# Patient Record
Sex: MALE | Race: WHITE | URBAN - METROPOLITAN AREA
[De-identification: names, ages, dates, MRNs, and addresses within clinical notes are randomized per-mention and may not be internally consistent; named-entity substitution may affect disease eponyms.]

---

## 2018-01-12 ENCOUNTER — OFFICE VISIT (OUTPATIENT)
Dept: FAMILY MEDICINE CLINIC | Facility: CLINIC | Age: 33
End: 2018-01-12

## 2018-01-12 VITALS
HEIGHT: 72 IN | OXYGEN SATURATION: 98 % | RESPIRATION RATE: 18 BRPM | TEMPERATURE: 100 F | HEART RATE: 94 BPM | SYSTOLIC BLOOD PRESSURE: 122 MMHG | WEIGHT: 170 LBS | BODY MASS INDEX: 23.03 KG/M2 | DIASTOLIC BLOOD PRESSURE: 80 MMHG

## 2018-01-12 DIAGNOSIS — J11.1 INFLUENZA-LIKE ILLNESS: Primary | ICD-10-CM

## 2018-01-12 PROCEDURE — 99202 OFFICE O/P NEW SF 15 MIN: CPT | Performed by: FAMILY MEDICINE

## 2018-01-12 RX ORDER — OSELTAMIVIR PHOSPHATE 75 MG/1
75 CAPSULE ORAL 2 TIMES DAILY
Qty: 10 CAPSULE | Refills: 0 | Status: SHIPPED | OUTPATIENT
Start: 2018-01-12 | End: 2018-01-17

## 2018-01-12 RX ORDER — BUPRENORPHINE HYDROCHLORIDE AND NALOXONE HYDROCHLORIDE 5.7; 1.4 MG/1; MG/1
TABLET, ORALLY DISINTEGRATING SUBLINGUAL
Refills: 0 | COMMUNITY
Start: 2018-01-06

## 2018-01-12 NOTE — PROGRESS NOTES
Patient presents with:  Post Nasal Drip: bodyaches x 1 day      SUBJECTIVE:   Isabela Esparza is a 34year old male who presents complaining of flu-like symptoms of fever to 100, malaise, fatigue, chills, myalgias, congestion, sore throat for 1 day with kera chains  Chest:  CTA, no wheezes, rhonchi or rales. Heart: RRR, no murmur. ASSESSMENT/PLAN:     Influenza-like illness (JOHN)  1. Symptomatic/supportive therapy as detailed in AVS  2. Antiviral therapy: tamiflu 75 bid x 5 days. . Risks vs benefits of m

## 2018-01-12 NOTE — PATIENT INSTRUCTIONS
Take tamiflu twice daily for 5 days with food. Use otc meds for comfort:  Ibuprofen for pain and fever or use combination cold remedies to reduce fever as well as nasal symptoms.   Consider applying adin's vapo-rub or eucalpytus oil to chest and feet at b

## 2020-10-29 ENCOUNTER — HOSPITAL ENCOUNTER (EMERGENCY)
Age: 35
Discharge: HOME | End: 2020-10-29
Attending: EMERGENCY MEDICINE
Payer: COMMERCIAL

## 2020-10-29 VITALS
HEART RATE: 56 BPM | RESPIRATION RATE: 16 BRPM | WEIGHT: 170 LBS | SYSTOLIC BLOOD PRESSURE: 140 MMHG | OXYGEN SATURATION: 100 % | DIASTOLIC BLOOD PRESSURE: 71 MMHG | TEMPERATURE: 97.9 F | BODY MASS INDEX: 23.03 KG/M2 | HEIGHT: 72 IN

## 2020-10-29 DIAGNOSIS — R30.0 DYSURIA: ICD-10-CM

## 2020-10-29 DIAGNOSIS — N34.2 URETHRITIS: Primary | ICD-10-CM

## 2020-10-29 LAB
APPEARANCE UR: CLEAR
BILIRUB UR QL: NEGATIVE
COLOR UR: YELLOW
GLUCOSE UR STRIP.AUTO-MCNC: NEGATIVE MG/DL
HGB UR QL STRIP: NEGATIVE
KETONES UR QL STRIP.AUTO: NEGATIVE MG/DL
LEUKOCYTE ESTERASE UR QL STRIP.AUTO: ABNORMAL
NITRITE UR QL STRIP.AUTO: NEGATIVE
PH UR STRIP: 7 [PH] (ref 5–8)
PROT UR STRIP-MCNC: NEGATIVE MG/DL
RBC #/AREA URNS HPF: NORMAL /HPF (ref 0–5)
SP GR UR REFRACTOMETRY: 1.01 (ref 1–1.03)
UROBILINOGEN UR QL STRIP.AUTO: 1 EU/DL (ref 0.2–1)
WBC URNS QL MICRO: NORMAL /HPF (ref 0–4)

## 2020-10-29 PROCEDURE — 81001 URINALYSIS AUTO W/SCOPE: CPT

## 2020-10-29 PROCEDURE — 74011250637 HC RX REV CODE- 250/637: Performed by: EMERGENCY MEDICINE

## 2020-10-29 PROCEDURE — 96372 THER/PROPH/DIAG INJ SC/IM: CPT

## 2020-10-29 PROCEDURE — 99283 EMERGENCY DEPT VISIT LOW MDM: CPT

## 2020-10-29 PROCEDURE — 87491 CHLMYD TRACH DNA AMP PROBE: CPT

## 2020-10-29 PROCEDURE — 74011000250 HC RX REV CODE- 250: Performed by: EMERGENCY MEDICINE

## 2020-10-29 PROCEDURE — 87086 URINE CULTURE/COLONY COUNT: CPT

## 2020-10-29 PROCEDURE — 74011250636 HC RX REV CODE- 250/636: Performed by: EMERGENCY MEDICINE

## 2020-10-29 RX ORDER — AZITHROMYCIN 250 MG/1
1000 TABLET, FILM COATED ORAL
Status: COMPLETED | OUTPATIENT
Start: 2020-10-29 | End: 2020-10-29

## 2020-10-29 RX ORDER — SULFAMETHOXAZOLE AND TRIMETHOPRIM 800; 160 MG/1; MG/1
1 TABLET ORAL 2 TIMES DAILY
Qty: 14 TAB | Refills: 0 | Status: SHIPPED | OUTPATIENT
Start: 2020-10-29 | End: 2020-11-05

## 2020-10-29 RX ADMIN — AZITHROMYCIN MONOHYDRATE 1000 MG: 250 TABLET ORAL at 19:38

## 2020-10-29 RX ADMIN — LIDOCAINE HYDROCHLORIDE 250 MG: 10 INJECTION, SOLUTION EPIDURAL; INFILTRATION; INTRACAUDAL; PERINEURAL at 19:38

## 2020-10-29 NOTE — ED NOTES
Bedside and Verbal shift change report given to Gary Angel RN (oncoming nurse) by Jose Estevez RN (offgoing nurse). Report included the following information SBAR and ED Summary.

## 2020-10-29 NOTE — ED PROVIDER NOTES
Pt c/o burning w urination, x 2-3 days. No h/o same. No abd pain, no rash or swelling. No penile dc. Prior new sexual conctact but has been many months, doubts std. No h/o std . No back pain. No prior eval for same. No past medical history on file. No past surgical history on file. No family history on file. Social History     Socioeconomic History    Marital status: SINGLE     Spouse name: Not on file    Number of children: Not on file    Years of education: Not on file    Highest education level: Not on file   Occupational History    Not on file   Social Needs    Financial resource strain: Not on file    Food insecurity     Worry: Not on file     Inability: Not on file    Transportation needs     Medical: Not on file     Non-medical: Not on file   Tobacco Use    Smoking status: Never Smoker    Smokeless tobacco: Never Used   Substance and Sexual Activity    Alcohol use: Yes     Comment: occ    Drug use: Not Currently     Types: Other, Opiates     Comment: Ashli Gorman Sexual activity: Not on file   Lifestyle    Physical activity     Days per week: Not on file     Minutes per session: Not on file    Stress: Not on file   Relationships    Social connections     Talks on phone: Not on file     Gets together: Not on file     Attends [employer] service: Not on file     Active member of club or organization: Not on file     Attends meetings of clubs or organizations: Not on file     Relationship status: Not on file    Intimate partner violence     Fear of current or ex partner: Not on file     Emotionally abused: Not on file     Physically abused: Not on file     Forced sexual activity: Not on file   Other Topics Concern    Not on file   Social History Narrative    Not on file         ALLERGIES: Patient has no known allergies. Review of Systems   Constitutional: Negative for fever. Gastrointestinal: Negative for abdominal pain and nausea.    Genitourinary: Positive for dysuria and frequency. All other systems reviewed and are negative. Vitals:    10/29/20 1852 10/29/20 1900   BP: (!) 140/71    Pulse: (!) 56    Resp: 16    Temp: 97.9 °F (36.6 °C)    SpO2: 100% 100%   Weight: 77.1 kg (170 lb)    Height: 6' (1.829 m)             Physical Exam  Vitals signs and nursing note reviewed. Constitutional:       Appearance: He is well-developed. HENT:      Head: Normocephalic and atraumatic. Neck:      Musculoskeletal: Normal range of motion. Pulmonary:      Effort: Pulmonary effort is normal.   Abdominal:      Palpations: Abdomen is soft. Tenderness: There is no abdominal tenderness. Musculoskeletal: Normal range of motion. Skin:     Findings: No rash. Neurological:      Mental Status: He is alert and oriented to person, place, and time.           MDM       Procedures    Vitals:  Patient Vitals for the past 12 hrs:   Temp Pulse Resp BP SpO2   10/29/20 1900     100 %   10/29/20 1852 97.9 °F (36.6 °C) (!) 56 16 (!) 140/71 100 %         Medications ordered:   Medications   azithromycin (ZITHROMAX) tablet 1,000 mg (1,000 mg Oral Given 10/29/20 1938)   cefTRIAXone (ROCEPHIN) 250 mg in lidocaine (PF) (XYLOCAINE) 10 mg/mL (1 %) IM injection (250 mg IntraMUSCular Given 10/29/20 1938)         Lab findings:  Recent Results (from the past 12 hour(s))   URINALYSIS W/ RFLX MICROSCOPIC    Collection Time: 10/29/20  6:50 PM   Result Value Ref Range    Color YELLOW      Appearance CLEAR      Specific gravity 1.012 1.005 - 1.030      pH (UA) 7.0 5.0 - 8.0      Protein Negative NEG mg/dL    Glucose Negative NEG mg/dL    Ketone Negative NEG mg/dL    Bilirubin Negative NEG      Blood Negative NEG      Urobilinogen 1.0 0.2 - 1.0 EU/dL    Nitrites Negative NEG      Leukocyte Esterase SMALL (A) NEG     URINE MICROSCOPIC ONLY    Collection Time: 10/29/20  6:50 PM   Result Value Ref Range    WBC 4 to 10 0 - 4 /hpf    RBC 0 to 3 0 - 5 /hpf           X-Ray, CT or other radiology findings or impressions:  No orders to display       Progress notes, Consult notes or additional Procedure notes:   Pt says recent unprot intercourse one momth ago w new partner. tx for urethritis, long d/w pt re need to f/u for complete std testing inc hiv. No emc.  abd soft and non-tender. Urine cx sent. Pt verb und of need for close f/u      Diagnosis:   1. Urethritis    2. Dysuria        Disposition: home    Follow-up Information     Follow up With Specialties Details Why Contact Info    Sean Vázquez MD Hospitalist Schedule an appointment as soon as possible for a visit in 2 days  [address] [facility] [address] [place] [address],[address]  Schedule an appointment as soon as possible for a visit in 2 days  [address] EMERGENCY DEPT Emergency Medicine Go to As needed, If symptoms worsen [facility]  [phone]           Discharge Medication List as of 10/29/2020  7:30 PM      START taking these medications    Details   trimethoprim-sulfamethoxazole (Bactrim DS) 160-800 mg per tablet Take 1 Tab by mouth two (2) times a day for 7 days. , Normal, Disp-14 Tab,R-0         CONTINUE these medications which have NOT CHANGED    Details   OTHER Zebsdanny, Historical Med, JED

## 2020-10-29 NOTE — DISCHARGE INSTRUCTIONS
You need follow up for further std testing including hiv as discussed. Return for pain, fever not resolving with motrin or tylenol, shortness of breath, vomiting, decreased fluid intake, weakness, numbness, dizziness, or any change or concerns. Patient Education        Painful Urination (Dysuria): Care Instructions  Your Care Instructions  Burning pain with urination (dysuria) is a common symptom of a urinary tract infection or other urinary problems. The bladder may become inflamed. This can cause pain when the bladder fills and empties. You may also feel pain if the tube that carries urine from the bladder to the outside of the body (urethra) gets irritated or infected. Sexually transmitted infections (STIs) also may cause pain when you urinate. Sometimes the pain can be caused by things other than an infection. The urethra can be irritated by soaps, perfumes, or foreign objects in the urethra. Kidney stones can cause pain when they pass through the urethra. The cause may be hard to find. You may need tests. Treatment for painful urination depends on the cause. Follow-up care is a key part of your treatment and safety. Be sure to make and go to all appointments, and call your doctor if you are having problems. It's also a good idea to know your test results and keep a list of the medicines you take. How can you care for yourself at home? · Drink extra water for the next day or two. This will help make the urine less concentrated. (If you have kidney, heart, or liver disease and have to limit fluids, talk with your doctor before you increase the amount of fluids you drink.)  · Avoid drinks that are carbonated or have caffeine. They can irritate the bladder. · Urinate often. Try to empty your bladder each time. For women:  · Urinate right after you have sex. · After going to the bathroom, wipe from front to back. · Avoid douches, bubble baths, and feminine hygiene sprays.  And avoid other feminine hygiene products that have deodorants. When should you call for help? Call your doctor now or seek immediate medical care if:    · You have new symptoms, such as fever, nausea, or vomiting.     · You have new or worse symptoms of a urinary problem. For example:  ? You have blood or pus in your urine. ? You have chills or body aches. ? It hurts worse to urinate. ? You have groin or belly pain. ? You have pain in your back just below your rib cage (the flank area). Watch closely for changes in your health, and be sure to contact your doctor if you have any problems. Where can you learn more? Go to http://www.gray.com/  Enter H814 in the search box to learn more about \"Painful Urination (Dysuria): Care Instructions. \"  Current as of: June 29, 2020               Content Version: 12.6  © 2006-2020 [employer]. Care instructions adapted under license by [employer] (which disclaims liability or warranty for this information). If you have questions about a medical condition or this instruction, always ask your healthcare professional. [place] [address] any warranty or liability for your use of this information. Patient Education        Gonorrhea and Chlamydia: About These Tests  What is it? These tests use a sample of urine or other body fluid to look for the bacteria that cause these sexually transmitted infections (STIs). The fluid sample can come from inside the tip of the penis or from inside the rectum or vagina. Why is this test done? These tests may be done to:  · Find out if your symptoms are caused by gonorrhea or chlamydia. · Find out if you have one of these infections even though you don't have symptoms.   How can you prepare for the test?  · If you are a woman, do not douche or use vaginal creams or medicines for at least 24 hours before the test.  · If you are going to have a urine test, do not urinate for 1 to 2 hours before the test.  · If you think you may have chlamydia or gonorrhea, don't have sexual intercourse until you get your test results. And you may want to have tests for other STIs, such as HIV. How is the test done? · To get a sample from the urethra or rectum, the doctor will put a small swab into the tip of the penis or inside the rectum. · To get a sample from the vagina, the doctor will first put a speculum into the vagina. A speculum is a tool to spread apart the walls of the vagina. Then he or she will use a small swab to get the fluid sample. · For a urine sample, you will collect the urine that comes out when you first start to urinate. Don't wipe the genital area clean before you urinate. How long does the test take? The test will take a few minutes. What happens after the test?  · You will be able to go home right away. · You can go back to your usual activities right away. · If you do have an infection, don't have sexual intercourse for 7 days after you start treatment. And your sex partner(s) should also be treated. Follow-up care is a key part of your treatment and safety. Be sure to make and go to all appointments, and call your doctor if you are having problems. It's also a good idea to keep a list of the medicines you take. Ask your doctor when you can expect to have your test results. Where can you learn more? Go to http://www.gray.com/  Enter L837 in the search box to learn more about \"Gonorrhea and Chlamydia: About These Tests. \"  Current as of: February 26, 2020               Content Version: 12.6  © 0909-8761 [employer]. Care instructions adapted under license by [employer] (which disclaims liability or warranty for this information).  If you have questions about a medical condition or this instruction, always ask your healthcare professional. [facility] [address] any warranty or liability for your use of this information.

## 2020-10-29 NOTE — ED NOTES
Shot time completed. No untoward signs and symptoms noted. I have reviewed discharge instructions with the patient. The patient verbalized understanding. Patient armband removed and given to patient to take home. Patient was informed of the privacy risks if armband lost or stolen  Current Discharge Medication List      START taking these medications    Details   trimethoprim-sulfamethoxazole (Bactrim DS) 160-800 mg per tablet Take 1 Tab by mouth two (2) times a day for 7 days.   Qty: 14 Tab, Refills: 0

## 2020-10-31 LAB
BACTERIA SPEC CULT: NORMAL
SERVICE CMNT-IMP: NORMAL

## 2020-11-02 LAB
C TRACH RRNA SPEC QL NAA+PROBE: NEGATIVE
N GONORRHOEA RRNA SPEC QL NAA+PROBE: NEGATIVE
SPECIMEN SOURCE: NORMAL